# Patient Record
Sex: MALE | Race: WHITE | NOT HISPANIC OR LATINO | URBAN - METROPOLITAN AREA
[De-identification: names, ages, dates, MRNs, and addresses within clinical notes are randomized per-mention and may not be internally consistent; named-entity substitution may affect disease eponyms.]

---

## 2017-03-12 ENCOUNTER — COMMUNICATION - HEALTHEAST (OUTPATIENT)
Dept: CARDIOLOGY | Facility: CLINIC | Age: 82
End: 2017-03-12

## 2017-03-12 DIAGNOSIS — I25.83 CORONARY ARTERY DISEASE DUE TO LIPID RICH PLAQUE: ICD-10-CM

## 2017-03-12 DIAGNOSIS — I25.10 CORONARY ARTERY DISEASE DUE TO LIPID RICH PLAQUE: ICD-10-CM

## 2017-05-01 ENCOUNTER — COMMUNICATION - HEALTHEAST (OUTPATIENT)
Dept: CARDIOLOGY | Facility: CLINIC | Age: 82
End: 2017-05-01

## 2017-05-01 DIAGNOSIS — I25.10 CAD (CORONARY ARTERY DISEASE): ICD-10-CM

## 2017-08-04 ENCOUNTER — OFFICE VISIT - HEALTHEAST (OUTPATIENT)
Dept: CARDIOLOGY | Facility: CLINIC | Age: 82
End: 2017-08-04

## 2017-08-04 DIAGNOSIS — E78.5 DYSLIPIDEMIA: ICD-10-CM

## 2017-08-04 DIAGNOSIS — I10 ESSENTIAL HYPERTENSION: ICD-10-CM

## 2017-08-04 DIAGNOSIS — I25.10 CORONARY ARTERY DISEASE INVOLVING NATIVE CORONARY ARTERY OF NATIVE HEART WITHOUT ANGINA PECTORIS: ICD-10-CM

## 2017-08-04 ASSESSMENT — MIFFLIN-ST. JEOR: SCORE: 1466.86

## 2017-12-08 ENCOUNTER — COMMUNICATION - HEALTHEAST (OUTPATIENT)
Dept: CARDIOLOGY | Facility: CLINIC | Age: 82
End: 2017-12-08

## 2017-12-08 DIAGNOSIS — I25.83 CORONARY ARTERY DISEASE DUE TO LIPID RICH PLAQUE: ICD-10-CM

## 2017-12-08 DIAGNOSIS — I25.10 CORONARY ARTERY DISEASE DUE TO LIPID RICH PLAQUE: ICD-10-CM

## 2018-08-24 ENCOUNTER — COMMUNICATION - HEALTHEAST (OUTPATIENT)
Dept: CARDIOLOGY | Facility: CLINIC | Age: 83
End: 2018-08-24

## 2018-08-24 DIAGNOSIS — I25.10 CAD (CORONARY ARTERY DISEASE): ICD-10-CM

## 2018-08-27 ENCOUNTER — COMMUNICATION - HEALTHEAST (OUTPATIENT)
Dept: ADMINISTRATIVE | Facility: CLINIC | Age: 83
End: 2018-08-27

## 2018-10-11 ENCOUNTER — OFFICE VISIT - HEALTHEAST (OUTPATIENT)
Dept: CARDIOLOGY | Facility: CLINIC | Age: 83
End: 2018-10-11

## 2018-10-11 DIAGNOSIS — I25.10 CORONARY ARTERY DISEASE INVOLVING NATIVE CORONARY ARTERY OF NATIVE HEART WITHOUT ANGINA PECTORIS: ICD-10-CM

## 2018-10-11 DIAGNOSIS — E78.5 DYSLIPIDEMIA: ICD-10-CM

## 2018-10-11 DIAGNOSIS — R07.2 PRECORDIAL PAIN: ICD-10-CM

## 2018-10-11 DIAGNOSIS — I10 ESSENTIAL HYPERTENSION: ICD-10-CM

## 2018-10-11 ASSESSMENT — MIFFLIN-ST. JEOR: SCORE: 1503.14

## 2018-10-22 ENCOUNTER — COMMUNICATION - HEALTHEAST (OUTPATIENT)
Dept: CARDIOLOGY | Facility: CLINIC | Age: 83
End: 2018-10-22

## 2018-10-22 DIAGNOSIS — I25.83 CORONARY ARTERY DISEASE DUE TO LIPID RICH PLAQUE: ICD-10-CM

## 2018-10-22 DIAGNOSIS — I25.10 CORONARY ARTERY DISEASE DUE TO LIPID RICH PLAQUE: ICD-10-CM

## 2019-01-02 ENCOUNTER — COMMUNICATION - HEALTHEAST (OUTPATIENT)
Dept: CARDIOLOGY | Facility: CLINIC | Age: 84
End: 2019-01-02

## 2019-01-02 DIAGNOSIS — I25.10 CAD (CORONARY ARTERY DISEASE): ICD-10-CM

## 2019-01-03 ENCOUNTER — COMMUNICATION - HEALTHEAST (OUTPATIENT)
Dept: CARDIOLOGY | Facility: CLINIC | Age: 84
End: 2019-01-03

## 2019-01-03 DIAGNOSIS — I25.10 CAD (CORONARY ARTERY DISEASE): ICD-10-CM

## 2019-01-03 RX ORDER — NITROGLYCERIN 0.4 MG/1
0.4 TABLET SUBLINGUAL EVERY 5 MIN PRN
Qty: 3 BOTTLE | Refills: 2 | Status: SHIPPED | OUTPATIENT
Start: 2019-01-03

## 2019-01-21 ENCOUNTER — HOSPITAL ENCOUNTER (OUTPATIENT)
Dept: NUCLEAR MEDICINE | Facility: HOSPITAL | Age: 84
Discharge: HOME | End: 2019-01-21
Attending: INTERNAL MEDICINE

## 2019-01-21 ENCOUNTER — HOSPITAL ENCOUNTER (OUTPATIENT)
Dept: CARDIOLOGY | Facility: HOSPITAL | Age: 84
Discharge: HOME | End: 2019-01-21
Attending: INTERNAL MEDICINE

## 2019-01-21 DIAGNOSIS — R07.2 PRECORDIAL PAIN: ICD-10-CM

## 2019-01-21 DIAGNOSIS — I25.10 CORONARY ARTERY DISEASE INVOLVING NATIVE CORONARY ARTERY OF NATIVE HEART WITHOUT ANGINA PECTORIS: ICD-10-CM

## 2019-01-21 LAB
CV STRESS CURRENT BP HE: NORMAL
CV STRESS CURRENT HR HE: 62
CV STRESS CURRENT HR HE: 63
CV STRESS CURRENT HR HE: 74
CV STRESS CURRENT HR HE: 77
CV STRESS CURRENT HR HE: 79
CV STRESS CURRENT HR HE: 80
CV STRESS CURRENT HR HE: 81
CV STRESS CURRENT HR HE: 82
CV STRESS CURRENT HR HE: 83
CV STRESS CURRENT HR HE: 84
CV STRESS CURRENT HR HE: 85
CV STRESS CURRENT HR HE: 85
CV STRESS CURRENT HR HE: 88
CV STRESS DEVIATION TIME HE: NORMAL
CV STRESS ECHO PERCENT HR HE: NORMAL
CV STRESS EXERCISE STAGE HE: NORMAL
CV STRESS FINAL RESTING BP HE: NORMAL
CV STRESS FINAL RESTING HR HE: 77
CV STRESS MAX HR HE: 89
CV STRESS MAX TREADMILL GRADE HE: 0
CV STRESS MAX TREADMILL SPEED HE: 0
CV STRESS PEAK DIA BP HE: NORMAL
CV STRESS PEAK SYS BP HE: NORMAL
CV STRESS PHASE HE: NORMAL
CV STRESS PROTOCOL HE: NORMAL
CV STRESS RESTING PT POSITION HE: NORMAL
CV STRESS ST DEVIATION AMOUNT HE: NORMAL
CV STRESS ST DEVIATION ELEVATION HE: NORMAL
CV STRESS ST EVELATION AMOUNT HE: NORMAL
CV STRESS TEST TYPE HE: NORMAL
CV STRESS TOTAL STAGE TIME MIN 1 HE: NORMAL
NUC STRESS EJECTION FRACTION: 75 %
STRESS ECHO BASELINE BP: NORMAL
STRESS ECHO BASELINE HR: 59
STRESS ECHO CALCULATED PERCENT HR: 67 %
STRESS ECHO LAST STRESS BP: NORMAL
STRESS ECHO LAST STRESS HR: 85

## 2019-01-21 ASSESSMENT — MIFFLIN-ST. JEOR: SCORE: 1498.61

## 2019-02-07 ENCOUNTER — COMMUNICATION - HEALTHEAST (OUTPATIENT)
Dept: CARDIOLOGY | Facility: CLINIC | Age: 84
End: 2019-02-07

## 2019-02-18 ENCOUNTER — COMMUNICATION - HEALTHEAST (OUTPATIENT)
Dept: CARDIOLOGY | Facility: CLINIC | Age: 84
End: 2019-02-18

## 2019-04-25 ENCOUNTER — COMMUNICATION - HEALTHEAST (OUTPATIENT)
Dept: CARDIOLOGY | Facility: CLINIC | Age: 84
End: 2019-04-25

## 2019-06-06 ENCOUNTER — OFFICE VISIT - HEALTHEAST (OUTPATIENT)
Dept: CARDIOLOGY | Facility: CLINIC | Age: 84
End: 2019-06-06

## 2019-06-06 DIAGNOSIS — I10 ESSENTIAL HYPERTENSION: ICD-10-CM

## 2019-06-06 DIAGNOSIS — I25.10 CORONARY ARTERY DISEASE INVOLVING NATIVE CORONARY ARTERY OF NATIVE HEART WITHOUT ANGINA PECTORIS: ICD-10-CM

## 2019-06-06 DIAGNOSIS — E78.5 DYSLIPIDEMIA: ICD-10-CM

## 2019-06-06 ASSESSMENT — MIFFLIN-ST. JEOR: SCORE: 1486.36

## 2019-07-24 ENCOUNTER — COMMUNICATION - HEALTHEAST (OUTPATIENT)
Dept: ADMINISTRATIVE | Facility: CLINIC | Age: 84
End: 2019-07-24

## 2019-09-12 ENCOUNTER — OFFICE VISIT - HEALTHEAST (OUTPATIENT)
Dept: CARDIOLOGY | Facility: CLINIC | Age: 84
End: 2019-09-12

## 2019-09-12 DIAGNOSIS — E78.5 DYSLIPIDEMIA: ICD-10-CM

## 2019-09-12 DIAGNOSIS — I10 HYPERTENSION, UNSPECIFIED TYPE: ICD-10-CM

## 2019-09-12 DIAGNOSIS — I25.10 CORONARY ARTERY DISEASE INVOLVING NATIVE CORONARY ARTERY OF NATIVE HEART WITHOUT ANGINA PECTORIS: ICD-10-CM

## 2019-09-12 LAB — LDLC SERPL CALC-MCNC: 73 MG/DL

## 2019-09-12 ASSESSMENT — MIFFLIN-ST. JEOR: SCORE: 1478.19

## 2020-02-20 ENCOUNTER — RECORDS - HEALTHEAST (OUTPATIENT)
Dept: ADMINISTRATIVE | Facility: OTHER | Age: 85
End: 2020-02-20

## 2020-03-07 ENCOUNTER — HOSPITAL ENCOUNTER (OUTPATIENT)
Dept: CT IMAGING | Facility: HOSPITAL | Age: 85
Discharge: HOME | End: 2020-03-07

## 2020-03-07 DIAGNOSIS — I63.9 ISCHEMIC STROKE (H): ICD-10-CM

## 2020-04-02 ENCOUNTER — COMMUNICATION - HEALTHEAST (OUTPATIENT)
Dept: CARDIOLOGY | Facility: CLINIC | Age: 85
End: 2020-04-02

## 2020-04-03 ENCOUNTER — AMBULATORY - HEALTHEAST (OUTPATIENT)
Dept: CARDIOLOGY | Facility: CLINIC | Age: 85
End: 2020-04-03

## 2020-04-09 ENCOUNTER — OFFICE VISIT - HEALTHEAST (OUTPATIENT)
Dept: CARDIOLOGY | Facility: CLINIC | Age: 85
End: 2020-04-09

## 2020-04-09 DIAGNOSIS — I25.10 CORONARY ARTERY DISEASE INVOLVING NATIVE CORONARY ARTERY OF NATIVE HEART WITHOUT ANGINA PECTORIS: ICD-10-CM

## 2020-04-09 DIAGNOSIS — I10 ESSENTIAL HYPERTENSION: ICD-10-CM

## 2020-04-09 DIAGNOSIS — E78.5 DYSLIPIDEMIA: ICD-10-CM

## 2020-04-09 RX ORDER — TOLTERODINE 4 MG/1
4 CAPSULE, EXTENDED RELEASE ORAL DAILY
Status: SHIPPED | COMMUNITY
Start: 2020-03-15

## 2020-04-09 RX ORDER — TAMSULOSIN HYDROCHLORIDE 0.4 MG/1
0.4 CAPSULE ORAL EVERY EVENING
Status: SHIPPED | COMMUNITY
Start: 2020-02-13

## 2020-04-09 RX ORDER — CLOPIDOGREL BISULFATE 75 MG/1
1 TABLET ORAL EVERY EVENING
Status: SHIPPED | COMMUNITY
Start: 2020-03-13

## 2020-05-21 ENCOUNTER — COMMUNICATION - HEALTHEAST (OUTPATIENT)
Dept: CARDIOLOGY | Facility: CLINIC | Age: 85
End: 2020-05-21

## 2020-05-21 DIAGNOSIS — I25.10 CORONARY ARTERY DISEASE INVOLVING NATIVE CORONARY ARTERY OF NATIVE HEART WITHOUT ANGINA PECTORIS: ICD-10-CM

## 2020-05-21 RX ORDER — ISOSORBIDE MONONITRATE 120 MG/1
TABLET, EXTENDED RELEASE ORAL
Qty: 90 TABLET | Refills: 3 | Status: SHIPPED | OUTPATIENT
Start: 2020-05-21

## 2020-10-01 ENCOUNTER — RECORDS - HEALTHEAST (OUTPATIENT)
Dept: ADMINISTRATIVE | Facility: OTHER | Age: 85
End: 2020-10-01

## 2020-10-01 ENCOUNTER — AMBULATORY - HEALTHEAST (OUTPATIENT)
Dept: CARDIOLOGY | Facility: CLINIC | Age: 85
End: 2020-10-01

## 2020-10-01 ENCOUNTER — COMMUNICATION - HEALTHEAST (OUTPATIENT)
Dept: CARDIOLOGY | Facility: CLINIC | Age: 85
End: 2020-10-01

## 2020-10-02 ENCOUNTER — SURGERY - HEALTHEAST (OUTPATIENT)
Dept: CARDIOLOGY | Facility: CLINIC | Age: 85
End: 2020-10-02

## 2020-10-02 ENCOUNTER — AMBULATORY - HEALTHEAST (OUTPATIENT)
Dept: LAB | Facility: CLINIC | Age: 85
End: 2020-10-02

## 2020-10-02 ENCOUNTER — AMBULATORY - HEALTHEAST (OUTPATIENT)
Dept: CARDIOLOGY | Facility: CLINIC | Age: 85
End: 2020-10-02

## 2020-10-02 ENCOUNTER — OFFICE VISIT - HEALTHEAST (OUTPATIENT)
Dept: CARDIOLOGY | Facility: CLINIC | Age: 85
End: 2020-10-02

## 2020-10-02 DIAGNOSIS — I25.10 CAD (CORONARY ARTERY DISEASE): ICD-10-CM

## 2020-10-02 DIAGNOSIS — I25.118 CORONARY ARTERY DISEASE INVOLVING NATIVE CORONARY ARTERY OF NATIVE HEART WITH OTHER FORM OF ANGINA PECTORIS (H): ICD-10-CM

## 2020-10-02 ASSESSMENT — MIFFLIN-ST. JEOR: SCORE: 1485

## 2020-10-04 ENCOUNTER — COMMUNICATION - HEALTHEAST (OUTPATIENT)
Dept: SCHEDULING | Facility: CLINIC | Age: 85
End: 2020-10-04

## 2020-10-06 ENCOUNTER — SURGERY - HEALTHEAST (OUTPATIENT)
Dept: CARDIOLOGY | Facility: CLINIC | Age: 85
End: 2020-10-06

## 2020-10-06 ASSESSMENT — MIFFLIN-ST. JEOR: SCORE: 1478.65

## 2020-10-20 ENCOUNTER — OFFICE VISIT - HEALTHEAST (OUTPATIENT)
Dept: CARDIOLOGY | Facility: CLINIC | Age: 85
End: 2020-10-20

## 2020-10-20 DIAGNOSIS — I25.118 CORONARY ARTERY DISEASE INVOLVING NATIVE CORONARY ARTERY OF NATIVE HEART WITH OTHER FORM OF ANGINA PECTORIS (H): ICD-10-CM

## 2020-10-20 DIAGNOSIS — I10 ESSENTIAL HYPERTENSION, BENIGN: ICD-10-CM

## 2020-10-20 DIAGNOSIS — E78.2 MIXED HYPERLIPIDEMIA: ICD-10-CM

## 2020-10-20 RX ORDER — PANTOPRAZOLE SODIUM 40 MG/1
40 TABLET, DELAYED RELEASE ORAL DAILY
Status: SHIPPED | COMMUNITY
Start: 2020-10-06

## 2020-10-20 ASSESSMENT — MIFFLIN-ST. JEOR: SCORE: 1511.31

## 2020-11-16 ENCOUNTER — COMMUNICATION - HEALTHEAST (OUTPATIENT)
Dept: CARDIOLOGY | Facility: CLINIC | Age: 85
End: 2020-11-16

## 2020-11-17 ENCOUNTER — OFFICE VISIT - HEALTHEAST (OUTPATIENT)
Dept: CARDIOLOGY | Facility: CLINIC | Age: 85
End: 2020-11-17

## 2020-11-17 DIAGNOSIS — E78.5 DYSLIPIDEMIA: ICD-10-CM

## 2020-11-17 DIAGNOSIS — I10 ESSENTIAL HYPERTENSION: ICD-10-CM

## 2020-11-17 DIAGNOSIS — I25.10 CORONARY ARTERY DISEASE INVOLVING NATIVE CORONARY ARTERY OF NATIVE HEART WITHOUT ANGINA PECTORIS: ICD-10-CM

## 2020-11-17 ASSESSMENT — MIFFLIN-ST. JEOR: SCORE: 1494.07

## 2021-04-08 ENCOUNTER — COMMUNICATION - HEALTHEAST (OUTPATIENT)
Dept: ADMINISTRATIVE | Facility: CLINIC | Age: 86
End: 2021-04-08

## 2021-05-19 ENCOUNTER — RECORDS - HEALTHEAST (OUTPATIENT)
Dept: LAB | Facility: CLINIC | Age: 86
End: 2021-05-19

## 2021-05-20 LAB — BACTERIA SPEC CULT: NORMAL

## 2021-05-22 ENCOUNTER — HEALTH MAINTENANCE LETTER (OUTPATIENT)
Age: 86
End: 2021-05-22

## 2021-05-27 ENCOUNTER — RECORDS - HEALTHEAST (OUTPATIENT)
Dept: ADMINISTRATIVE | Facility: CLINIC | Age: 86
End: 2021-05-27

## 2021-05-27 ENCOUNTER — RECORDS - HEALTHEAST (OUTPATIENT)
Dept: LAB | Facility: CLINIC | Age: 86
End: 2021-05-27

## 2021-05-28 LAB
ANION GAP SERPL CALCULATED.3IONS-SCNC: 15 MMOL/L (ref 5–18)
BUN SERPL-MCNC: 19 MG/DL (ref 8–28)
CALCIUM SERPL-MCNC: 9.4 MG/DL (ref 8.5–10.5)
CHLORIDE BLD-SCNC: 97 MMOL/L (ref 98–107)
CO2 SERPL-SCNC: 25 MMOL/L (ref 22–31)
CREAT SERPL-MCNC: 0.93 MG/DL (ref 0.7–1.3)
ERYTHROCYTE [DISTWIDTH] IN BLOOD BY AUTOMATED COUNT: 13 % (ref 11–14.5)
GFR SERPL CREATININE-BSD FRML MDRD: >60 ML/MIN/1.73M2
GLUCOSE BLD-MCNC: 84 MG/DL (ref 70–125)
HCT VFR BLD AUTO: 38.2 % (ref 40–54)
HGB BLD-MCNC: 12.7 G/DL (ref 14–18)
MCH RBC QN AUTO: 31.4 PG (ref 27–34)
MCHC RBC AUTO-ENTMCNC: 33.2 G/DL (ref 32–36)
MCV RBC AUTO: 95 FL (ref 80–100)
PLATELET # BLD AUTO: 106 THOU/UL (ref 140–440)
PMV BLD AUTO: 9.8 FL (ref 8.5–12.5)
POTASSIUM BLD-SCNC: 4.7 MMOL/L (ref 3.5–5)
RBC # BLD AUTO: 4.04 MILL/UL (ref 4.4–6.2)
SODIUM SERPL-SCNC: 137 MMOL/L (ref 136–145)
WBC: 6.6 THOU/UL (ref 4–11)

## 2021-05-28 NOTE — TELEPHONE ENCOUNTER
----- Message from Nicky Sweet sent at 4/25/2019  3:55 PM CDT -----  Contact: Patient   Caller: Byron    Primary cardiologist: Dr. Islas    Detailed reason for call: Byron states he's been having chest pain and his PCP said he should see the cardiologist. Byron made an appt w/Dr. Islas on 6/6/19. Please call back.     New or active symptoms? See above    Best phone number: [phone]    Best time to contact: Today    Ok to leave a detailed message? No    Device? No

## 2021-05-28 NOTE — TELEPHONE ENCOUNTER
Patient was called by  for appointment in recall and reported 1 episode of stabbing chest pain about 2 weeks ago lasting for 30 minutes. He took 3 nitroglycerin tablets during that 30 minute period. He is feeling well since without any new or worsening symptoms.     He was instructed to call 911 with any further prolonged episode of chest pain.  Patient verbalized understanding and will return the call with any further symptoms to be set up in [facility].

## 2021-05-29 ENCOUNTER — COMMUNICATION - HEALTHEAST (OUTPATIENT)
Dept: SCHEDULING | Facility: CLINIC | Age: 86
End: 2021-05-29

## 2021-05-31 VITALS — WEIGHT: 187 LBS | HEIGHT: 67 IN | BODY MASS INDEX: 29.35 KG/M2

## 2021-06-01 VITALS — BODY MASS INDEX: 30.61 KG/M2 | HEIGHT: 67 IN | WEIGHT: 195 LBS

## 2021-06-02 VITALS — HEIGHT: 67 IN | WEIGHT: 194 LBS | BODY MASS INDEX: 30.45 KG/M2

## 2021-06-02 VITALS — WEIGHT: 194.8 LBS | BODY MASS INDEX: 31.31 KG/M2 | HEIGHT: 66 IN

## 2021-06-03 VITALS
HEART RATE: 64 BPM | RESPIRATION RATE: 16 BRPM | DIASTOLIC BLOOD PRESSURE: 66 MMHG | HEIGHT: 66 IN | WEIGHT: 193 LBS | SYSTOLIC BLOOD PRESSURE: 136 MMHG | BODY MASS INDEX: 31.02 KG/M2

## 2021-06-04 VITALS
DIASTOLIC BLOOD PRESSURE: 60 MMHG | BODY MASS INDEX: 30.29 KG/M2 | WEIGHT: 193 LBS | HEIGHT: 67 IN | HEART RATE: 56 BPM | OXYGEN SATURATION: 93 % | SYSTOLIC BLOOD PRESSURE: 140 MMHG | RESPIRATION RATE: 16 BRPM

## 2021-06-04 VITALS
WEIGHT: 191 LBS | DIASTOLIC BLOOD PRESSURE: 50 MMHG | RESPIRATION RATE: 10 BRPM | HEART RATE: 54 BPM | SYSTOLIC BLOOD PRESSURE: 128 MMHG | HEIGHT: 67 IN | BODY MASS INDEX: 29.98 KG/M2

## 2021-06-04 VITALS
BODY MASS INDEX: 31.15 KG/M2 | DIASTOLIC BLOOD PRESSURE: 64 MMHG | WEIGHT: 193 LBS | SYSTOLIC BLOOD PRESSURE: 134 MMHG | HEART RATE: 57 BPM

## 2021-06-04 VITALS
HEART RATE: 60 BPM | RESPIRATION RATE: 16 BRPM | HEIGHT: 67 IN | DIASTOLIC BLOOD PRESSURE: 52 MMHG | SYSTOLIC BLOOD PRESSURE: 122 MMHG | WEIGHT: 196.8 LBS | BODY MASS INDEX: 30.89 KG/M2

## 2021-06-04 VITALS — HEIGHT: 67 IN | BODY MASS INDEX: 29.76 KG/M2 | WEIGHT: 189.6 LBS

## 2021-06-07 ENCOUNTER — AMBULATORY - HEALTHEAST (OUTPATIENT)
Dept: OTHER | Facility: CLINIC | Age: 86
End: 2021-06-07

## 2021-06-07 ENCOUNTER — DOCUMENTATION ONLY (OUTPATIENT)
Dept: OTHER | Facility: CLINIC | Age: 86
End: 2021-06-07

## 2021-06-07 NOTE — TELEPHONE ENCOUNTER
----- Message from Charleen Akins sent at 4/2/2020 10:06 AM CDT -----  General phone call:  PATIENT WAS INPATIENT AT [facility] FOR CHEST PAIN,EVERYTHING TESTED OUT WELL, BUT THEY WANT HIM TO FOLLOW UP WITH [facility].  SHOULD THIS BE TELEPHONE, OFFICE OR POST PONE?  PLEASE CALL  Caller: WIFE, TANYA  Primary cardiologist: DR HAM  Detailed reason for call: SEE ABOVE  New or active symptoms? NO, NOT AT THIS TIME  Best phone number: [phone]  Best time to contact: ANY TIME  Ok to leave a detailed message? YES  Device? NO    Additional Info:

## 2021-06-07 NOTE — TELEPHONE ENCOUNTER
Hi,  Per recommendation below by [facility] cardiology please call patient and arrange telephone visit for next week with Dr. Islas.  Thank you!    ---------------------------------------  Per [facility] addmission docummentation 3/31:    Troponins were trended and remained negative x3. The patients stress test results show a low risk scan and his TTE is unremarkable except for a possible elevated LVEDP but the patient does not report SOB. Discussed with cardiology who reviewed the imaging and recommended discharging the patient with continued medication regimen and [facility] with a telephone visit within the next week. Communicated these recommendations to patient and son who understood and would make necessary follow up arrangements after discharge.     Other specialty follow-up not included in DC orders: Patient should follow up with outpatient cardiologist within a week for phone follow up

## 2021-06-11 NOTE — TELEPHONE ENCOUNTER
Wellness Screening Tool  Symptom Screening:  Do you have one of the following NEW symptoms:    Fever (subjective or >100.0)?  No    A new cough?  No    Shortness of breath?  No     Chills? No     New loss of taste or smell? No     Generalized body aches? No     New persistent headache? No     New sore throat? No     Nausea, vomiting, or diarrhea?  No    Within the past 2 weeks, have you been exposed to someone with a known positive illness below:    COVID-19 (known or suspected)?  No    Chicken pox?  No    Mealses?  No    Pertussis?  No    Patient notified of visitor policy- They may have one person accompany them to their appointment, but they will need to wear a mask and will be screened upon arrival for symptoms: Yes  Pt informed to wear a mask: Yes  Pt notified if they develop any symptoms listed above, prior to their appointment, they are to call the clinic directly at [phone] for further instructions.  Yes  Patient's appointment status: Patient will be seen in clinic as scheduled on 10/2

## 2021-06-12 NOTE — PATIENT INSTRUCTIONS - HE
Byron Nath,    It was a pleasure to see you today at the [facility].     My recommendations after this visit include:    Coronary angiogram    LORNA Alanis MD, FACC, DELMIS

## 2021-06-12 NOTE — PROGRESS NOTES
Byron [facility]  [address]  [place] [address]  [phone] (home)     Primary cardiologist:  Dr Islas  PCP:  Vinh Heart MD  Procedure:  CA poss PCI  H&P completed by:  Dr Zeke Hemphill MD:  Dr Rivers or Dr Rose  Admit date and time:  10/6/20 07:30 am  Ordering MD:  Dr Aalnis  Diagnosis:  CAD  Anticoagulation: None  CPAP: No  Bypass Grafts: Yes  Renal Issues: No  Allergies:   Allergies   Allergen Reactions     Rosuvastatin Other (See Comments)     Myalgia      Diabetic?: No  Device?: No      Angiogram Teaching    Reason for Visit:  Patient seen for pre-procedure education in preparation for: coronary angiogram with possible coronary intervention    Procedure Prep:  Cardiologist note dated: 10/2/20  EKG results obtained, dated: on admission  Pertinent test results obtained - Viewable in Epic, dated: 1/21/10 NM stress test, 3/4/09 CABx2, 3/2/09 cath  Hemogram results obtained: on admission  Basic Metabolic Panel results obtained: on admission  Lipid Profile results obtained: on admission    Pre-procedure instructions  Patient instructed to be NPO after midnight.  Patient instructed to arrange for transportation home following procedure.  Patient instructed to have a responsible adult with them for 24 hours post-procedure.  Post-procedure follow up process.  Conscious sedation discussed.  The patient was given the pre-procedure letter on 10/2/20    Pre-procedure medication instructions  Patient instructed on antiplatelet medication.  Continue medications as scheduled, with a small amount of water on the day of the procedure unless indicated.  Patient instructed to take 325 mg of Aspirin am of procedure: Yes  Other medication: instructed to hold all except clopidogrel, isosorbide, metoprolol, tolterodine, a.m. of the procedure.    *PATIENTS RECORDS AVAILABLE IN [employer] UNLESS OTHERWISE INDICATED*    *Order set was entered on this date: 10/2/20      Patient Active Problem List    Diagnosis     CAD (coronary artery disease)     Coronary artery disease involving native coronary artery of native heart with other form of angina pectoris (H)       Current Outpatient Medications   Medication Sig Dispense Refill     calcium carbonate-vitamin D3 600 mg calcium- 200 unit cap Take 1 tablet by mouth 2 (two) times a day.       citalopram (CELEXA) 40 MG tablet Take 20 mg by mouth daily.        clopidogreL (PLAVIX) 75 mg tablet Take 1 tablet by mouth every evening.        denosumab 60 mg/mL Syrg Inject 60 mg under the skin every 6 (six) months.       docusate sodium (COLACE ORAL) Take 1 capsule by mouth 2 (two) times a day.        donepeziL (ARICEPT) 10 MG tablet Take 10 mg by mouth at bedtime.       isosorbide mononitrate (IMDUR) 120 MG 24 hr tablet Take 1 tablet  by mouth daily. 90 tablet 3     loratadine (CLARITIN) 10 mg tablet Take 10 mg by mouth daily.       melatonin 3 mg Tab tablet Take 6 mg by mouth at bedtime.       metoprolol (LOPRESSOR) 25 MG tablet Take 12.5 mg by mouth 2 (two) times a day.        nitroglycerin (NITROSTAT) 0.4 MG SL tablet Place 1 tablet (0.4 mg total) under the tongue every 5 (five) minutes as needed for chest pain. Max 6 tabs per day and call 911 if no relief 3 Bottle 2     psyllium with dextrose (METAMUCIL JAR) powder Take 3.4 g by mouth daily.        simvastatin (ZOCOR) 40 MG tablet Take 40 mg by mouth bedtime.       tamsulosin (FLOMAX) 0.4 mg cap Take 0.4 mg by mouth every evening.        tolterodine (DETROL LA) 4 MG ER capsule Take 4 mg by mouth daily.       No current facility-administered medications for this visit.        Allergies   Allergen Reactions     Rosuvastatin Other (See Comments)     Myalgia        Plan  Patient ready for procedure    Almita RIVERA RN

## 2021-06-16 PROBLEM — G31.84 MCI (MILD COGNITIVE IMPAIRMENT): Status: ACTIVE | Noted: 2019-01-01

## 2021-06-16 PROBLEM — F03.90 DEMENTIA (H): Status: ACTIVE | Noted: 2020-01-01

## 2021-06-16 PROBLEM — M25.561 ACUTE PAIN OF RIGHT KNEE: Status: ACTIVE | Noted: 2017-07-02

## 2021-06-16 PROBLEM — C44.91 MALIGNANT BASAL CELL NEOPLASM OF SKIN: Status: ACTIVE | Noted: 2021-05-28

## 2021-06-16 PROBLEM — I25.118 CORONARY ARTERY DISEASE INVOLVING NATIVE CORONARY ARTERY OF NATIVE HEART WITH OTHER FORM OF ANGINA PECTORIS (H): Status: ACTIVE | Noted: 2020-10-02

## 2021-06-16 PROBLEM — I20.0 UNSTABLE ANGINA PECTORIS (H): Status: ACTIVE | Noted: 2017-06-19

## 2021-06-16 PROBLEM — K76.9 LIVER LESION: Status: ACTIVE | Noted: 2021-05-29

## 2021-06-16 PROBLEM — I63.9 CVA (CEREBRAL VASCULAR ACCIDENT) (H): Status: ACTIVE | Noted: 2020-01-01

## 2021-06-16 PROBLEM — I10 HTN (HYPERTENSION): Status: ACTIVE | Noted: 2021-05-28

## 2021-06-16 PROBLEM — R09.02 HYPOXIA: Status: ACTIVE | Noted: 2021-05-28

## 2021-06-16 PROBLEM — R07.89 ATYPICAL CHEST PAIN: Status: ACTIVE | Noted: 2018-04-07

## 2021-06-16 PROBLEM — M81.0 AGE RELATED OSTEOPOROSIS: Status: ACTIVE | Noted: 2020-03-02

## 2021-06-16 PROBLEM — R79.89 ELEVATED LFTS: Status: ACTIVE | Noted: 2021-05-29

## 2021-06-16 PROBLEM — J98.59 MEDIASTINAL MASS: Status: ACTIVE | Noted: 2021-05-29

## 2021-06-19 NOTE — LETTER
Letter by Jhon Islas MD at      Author: Jhon Islas MD Service: -- Author Type: --    Filed:  Encounter Date: 7/24/2019 Status: (Other)         Byron Nath  [address] [place] [address]      July 24, 2019      Dear Byron,    This letter is to remind you that you will be due for your follow up appointment with Dr. Williams Islas  . To help ensure you are in the best health possible, a regular follow-up with your cardiologist is essential.     Please call our Patient Scheduling Line at [phone] to schedule your appointment at your earliest convenience.  If you have recently scheduled an appointment, please disregard this letter.    We look forward to seeing you again. As always, we are available at the number  above for any questions or concerns you may have.      Sincerely,     The Physicians and Staff of [facility]

## 2021-06-19 NOTE — LETTER
Letter by Jhon Islas MD at      Author: Jhon Islas MD Service: -- Author Type: --    Filed:  Encounter Date: 7/24/2019 Status: (Other)         Byron Nath  [address] [address]      July 24, 2019      Dear Byron,    This letter is to remind you that you will be due for your follow up appointment with Dr. Williams Islas  . To help ensure you are in the best health possible, a regular follow-up with your cardiologist is essential.     Please call our Patient Scheduling Line at [phone] to schedule your appointment at your earliest convenience.  If you have recently scheduled an appointment, please disregard this letter.    We look forward to seeing you again. As always, we are available at the number  above for any questions or concerns you may have.      Sincerely,     The Physicians and Staff of [facility]

## 2021-06-21 NOTE — LETTER
Letter by Jhon Islas MD at      Author: Jhon Islas MD Service: -- Author Type: --    Filed:  Encounter Date: 4/8/2021 Status: (Other)         Byron Nath  [address]  [place] [address]      April 8, 2021      Dear Byron,    This letter is to remind you that you will be due for your follow up appointment with Dr. Williams Islas in April, 2021 . To help ensure you are in the best health possible, a regular follow-up with your cardiologist is essential.     Please call our Patient Scheduling Line at [phone] to schedule your appointment at your earliest convenience.  If you have recently scheduled an appointment, please disregard this letter.    We look forward to seeing you again. As always, we are available at the number  above for any questions or concerns you may have.      Sincerely,     The Physicians and Staff of [facility]

## 2021-06-23 NOTE — TELEPHONE ENCOUNTER
----- Message from Charleen Akins sent at 2/7/2019 10:37 AM CST -----  Contact: S/O TANYA WOLF  General phone call:  BONE DENSITY ISSUES, DR HAM WANTED TO TRY A NEW MEDICATION, IT WAS SENT TO [facility], HOWEVER THE PHARMACY DIDN'T HEAR FROM DR HAM OR INSURANCE, PLEASE CALL TO HELP  Caller: TANYA  Primary cardiologist: DR HAM  Detailed reason for call: SEE ABOVE  New or active symptoms? YES, SEE ABOVE  Best phone number: [phone]  Best time to contact: ANY TIME  Ok to leave a detailed message? YES  Device? NO    Additional Info:

## 2021-06-25 NOTE — PROGRESS NOTES
Progress Notes by Jhon Islas MD at 8/4/2017 11:30 AM     Author: Jhon Islas MD Service: -- Author Type: Physician    Filed: 8/4/2017 11:48 AM Encounter Date: 8/4/2017 Status: Signed    : Jhon Islas MD (Physician)                  [facility]/Heart           Thank you Dr. Heart for asking the [facility] team to participate in the care of your patient, Byron Nath.     Impression and Plan     1. Coronary artery disease. Byron has known coronary artery disease having had 2 vessel bypass surgery for March 2009 with CECE graft to the LAD and saphenous vein graft to the PL branch of the right coronary artery.  Recent nuclear perfusion study 20 June 2017 performed at [facility] was favorable and revealed no evidence of ischemia.    This overall is stable.  Patient reports no chest pain symptoms at this time.     2. Hypertension. Blood pressures quite reasonable in the office today.     3. Dyslipidemia.  Lipid profile 6 October 2016 was fairly close to target with LDL 88 mg/dL and HDL 38 mg/dL.    Plan on follow-up in one year.          History of Present Illness    Once again I would like to thank you again for asking me to participate in the care of your patient, Byron Nath.  As you know, but to reiterate for my own records, Byron Nath is a 86 y.o. male with known coronary disease. He underwent 2 vessel bypass surgery March 4, 2009 with CECE graft to the LAD and saphenous vein graft to the PL branch of the right coronary artery. Historically, he has had well-preserved LV systolic performance.    In late June 2017, patient had been hospitalized at [facility] after having an episode of chest discomfort.  He had an evaluation at that time which included a regadenoson nuclear perfusion study on 20 June 2017 which was favorable and revealed no evidence of ischemia.     In follow-up today, Byron denies any recurrent chest pain symptoms.  He states his  "breathing is comfortable.  He denies any decline in exercise tolerance other than that related to some mild orthopedic issues.  He reports no palpitations or lightheadedness.    Further review of systems is otherwise negative/noncontributory (medical record and 13 point review of systems reviewed as well and pertinent positives noted).         Cardiac Diagnostics      Echocardiogram 17 June 2016 (personally reviewed):  1. Normal left ventricular size and systolic performance. The ejection fraction is estimated to be 55%.  2. There is mild aortic valve sclerosis without significant valvular stenosis.    Echocardiogram from 7 May 2007  1. Normal LV size and systolic performance with ejection fraction of 65%.    2. No significant valvular disease.    Regadenoson nuclear perfusion study 20 June 2017 (performed at [facility]):  1. No evidence of infarct or ischemia.  2. Normal left ventricular systolic performance with ejection fraction of 59%.     Nuclear stress perfusion study performed 12 March 2013:  1. No ECG evidence of ischemia.  2. No evidence of ischemia or infarct by nuclear imaging.  3. Normal ejection fraction at 72% without wall motion abnormality.           Physical Examination       /72 (Patient Site: Right Arm, Patient Position: Sitting, Cuff Size: Adult Regular)  Pulse 80  Resp 16  Ht 5' 7\" (1.702 m)  Wt 187 lb (84.8 kg)  SpO2 96%  BMI 29.29 kg/m2        Wt Readings from Last 3 Encounters:   08/04/17 187 lb (84.8 kg)   07/01/16 195 lb (88.5 kg)   05/19/16 195 lb 12.8 oz (88.8 kg)     The patient is alert and oriented times three. Sclerae are anicteric. Mucosal membranes are moist. Jugular venous pressure is normal. No significant adenopathy/thyromegally appreciated. Lungs are clear with good expansion. On cardiovascular exam, the patient has a regular S1 and S2. Abdomen is soft and non-tender. Extremities reveal no clubbing, cyanosis, or edema.           Family History/Social " History/Risk Factors   Patient does not smoke.  Family history of hypertension.         Medications  Allergies   Current Outpatient Prescriptions   Medication Sig Dispense Refill   ? aspirin 81 MG EC tablet Take 81 mg by mouth daily.     ? citalopram (CELEXA) 40 MG tablet Take 40 mg by mouth daily.     ? isosorbide mononitrate (IMDUR) 30 MG 24 hr tablet TAKE 1 TABLET DAILY 90 tablet 2   ? loratadine (CLARITIN) 10 mg tablet Take 10 mg by mouth daily.     ? simvastatin (ZOCOR) 40 MG tablet Take 40 mg by mouth bedtime.     ? metoprolol (LOPRESSOR) 25 MG tablet Take 25 mg by mouth 2 (two) times a day.     ? nitroglycerin (NITROSTAT) 0.4 MG SL tablet Place 1 tablet (0.4 mg total) under the tongue every 5 (five) minutes as needed for chest pain. 25 tablet 3     No current facility-administered medications for this visit.       Allergies   Allergen Reactions   ? Rosuvastatin Other (See Comments)          Lab Results     Lab Results   Component Value Date    CHOL 160 01/05/2011    TRIG 158 (H) 01/05/2011    HDL 37 (L) 01/05/2011    LDLCALC 91 01/05/2011

## 2021-06-26 NOTE — PROGRESS NOTES
Progress Notes by Jhon Islas MD at 10/11/2018  4:00 PM     Author: Jhon Islas MD Service: -- Author Type: Physician    Filed: 10/11/2018  4:10 PM Encounter Date: 10/11/2018 Status: Signed    : Jhon Islas MD (Physician)                  [facility]org/Heart  [phone]         Thank you Dr. Heart for asking the [facility] Heart Care team to participate in the care of your patient, Byron Nath.     Impression and Plan     1. Coronary artery disease. Byron has known coronary artery disease having had 2 vessel bypass surgery for March 2009 with CECE graft to the LAD and saphenous vein graft to the PL branch of the right coronary artery.  As noted below, patient does report some subjective increase in chest discomfort symptoms that do seem to be improved with nitroglycerin.  He has had some perceived increase in shortness of breath with certain activities as well.  Plan:    Nuclear perfusion imaging study    Continue current antianginal therapy.  ?  2. Hypertension. Blood pressures quite reasonable in the office today.  3. Dyslipidemia.  Lipid profile 6 October 2016 was fairly close to target with LDL 88 mg/dL and HDL 38 mg/dL.  Follow-up and further recommendations pending nuclear perfusion imaging results.         History of Present Illness    Once again I would like to thank you again for asking me to participate in the care of your patient, Byron Nath.  As you know, but to reiterate for my own records, Byron Nath is a 88 y.o. male with known coronary disease. He underwent 2 vessel bypass surgery March 4, 2009 with CECE graft to the LAD and saphenous vein graft to the PL branch of the right coronary artery. Historically, he has had well-preserved LV systolic performance     Interview today, Byron does report some increasing chest discomfort symptoms reminiscent of his angina.  He has taken nitroglycerin on occasion which has helped.  He also reports some  "mild increased shortness of breath with certain activities.  He denies any palpitations or lightheadedness.    Further review of systems is otherwise negative/noncontributory (medical record and 13 point review of systems reviewed as well and pertinent positives noted).         Cardiac Diagnostics      Echocardiogram 17 June 2016 (personally reviewed):  1. Normal left ventricular size and systolic performance. The ejection fraction is estimated to be 55%.  2. There is mild aortic valve sclerosis without significant valvular stenosis.    Echocardiogram from 7 May 2007  1. Normal LV size and systolic performance with ejection fraction of 65%.    2. No significant valvular disease.    Regadenoson nuclear perfusion study 20 June 2017 (performed at [facility]):  1. No evidence of infarct or ischemia.  2. Normal left ventricular systolic performance with ejection fraction of 59%.    Nuclear stress perfusion study performed 12 March 2013:  1. No ECG evidence of ischemia.  2. No evidence of ischemia or infarct by nuclear imaging.  3. Normal ejection fraction at 72% without wall motion abnormality.           Physical Examination       /60  Pulse 76  Resp 20  Ht 5' 7\" (1.702 m)  Wt 195 lb (88.5 kg)  BMI 30.54 kg/m2        Wt Readings from Last 3 Encounters:   10/11/18 195 lb (88.5 kg)   08/04/17 187 lb (84.8 kg)   07/01/16 195 lb (88.5 kg)     The patient is alert and oriented times three. Sclerae are anicteric. Mucosal membranes are moist. Jugular venous pressure is normal. No significant adenopathy/thyromegally appreciated. Lungs are clear with good expansion. On cardiovascular exam, the patient has a regular S1 and S2. Abdomen is soft and non-tender. Extremities reveal no clubbing, cyanosis, or edema.         Family History/Social History/Risk Factors   Patient does not smoke.  Family history of hypertension.         Medications  Allergies   Current Outpatient Prescriptions   Medication Sig Dispense Refill "   ? acetylcysteine (NAC) 600 mg cap capsule Take 600 mg by mouth.     ? aspirin 81 MG EC tablet Take 81 mg by mouth daily.     ? calcium carbonate-vitamin D2 500 mg(1,250mg) -200 unit tablet Take 1 tablet by mouth 2 (two) times a day.     ? citalopram (CELEXA) 40 MG tablet Take 20 mg by mouth daily.      ? docusate sodium (COLACE ORAL) Take by mouth.     ? isosorbide mononitrate (IMDUR) 30 MG 24 hr tablet TAKE 1 TABLET DAILY (Patient taking differently: TAKE 2 TABLETS DAILY) 90 tablet 2   ? loratadine (CLARITIN) 10 mg tablet Take 10 mg by mouth daily.     ? metoprolol (LOPRESSOR) 25 MG tablet Take 12.5 mg by mouth 2 (two) times a day.      ? MULTIVITAMIN ORAL Take by mouth.     ? nitroglycerin (NITROSTAT) 0.4 MG SL tablet PLACE 1 TABLET UNDER THE TONGUE EVERY 5 MINUTES AS NEEDED FOR CHEST PAIN  3 Bottle 0   ? simvastatin (ZOCOR) 40 MG tablet Take 40 mg by mouth bedtime.       No current facility-administered medications for this visit.       Allergies   Allergen Reactions   ? Rosuvastatin Other (See Comments)          Lab Results     Lab Results   Component Value Date    CHOL 160 01/05/2011    TRIG 158 (H) 01/05/2011    HDL 37 (L) 01/05/2011    LDLCALC 91 01/05/2011

## 2021-06-27 NOTE — PROGRESS NOTES
Progress Notes by Jhon Islas MD at 6/6/2019  4:40 PM     Author: Jhon Islas MD Service: -- Author Type: Physician    Filed: 6/10/2019  1:05 PM Encounter Date: 6/6/2019 Status: Signed    : Jhon Islas MD (Physician)                  [email or web address]  [phone]         Thank you Dr. Heart for asking the [facility] Heart Care team to participate in the care of your patient, Byron Nath.     Impression and Plan     1. Coronary artery disease. Byron has known coronary artery disease having had 2 vessel bypass surgery for March 2009 with CECE graft to the LAD and saphenous vein graft to the PL branch of the right coronary artery.    Patient underwent nuclear perfusion imaging 21 January 2019 which was favorable and revealed no evidence of infarct or ischemia (see Cardiac Diagnostic section below).  He has, however, he had some continued intermittent chest discomfort.  We discussed the various options including continued attempts at medical management.  Could consider direct angiography.  Ultimately was jointly decided with the patient to continue with medical therapy.  Plan:    Increase isosorbide mononitrate to 120 mg daily.    2. Hypertension. Blood pressures quite reasonable in the office today.    3. Dyslipidemia.  Lipid profile 6 October 2016 was fairly close to target with LDL 88 mg/dL and HDL 38 mg/dL.               History of Present Illness    Once again I would like to thank you again for asking me to participate in the care of your patient, Byron Nath.  As you know, but to reiterate for my own records, Byron Nath is a 88 y.o. male with known coronary disease. He underwent 2 vessel bypass surgery March 4, 2009 with CECE graft to the LAD and saphenous vein graft to the PL branch of the right coronary artery. Historically, he has had well-preserved LV systolic performance.     Interview today, Byron does report some increasing chest discomfort  "symptoms reminiscent of his angina.  He has taken nitroglycerin on occasion which has helped.  He also reports some mild increased shortness of breath with certain activities.  He denies any palpitations or lightheadedness.    Further review of systems is otherwise negative/noncontributory (medical record and 13 point review of systems reviewed as well and pertinent positives noted).         Cardiac Diagnostics          Echocardiogram 17 June 2016 (personally reviewed):  1. Normal left ventricular size and systolic performance. The ejection fraction is estimated to be 55%.  2. There is mild aortic valve sclerosis without significant valvular stenosis.    Echocardiogram from 7 May 2007  1. Normal LV size and systolic performance with ejection fraction of 65%.    2. No significant valvular disease.    Regadenoson nuclear perfusion study 21 January 2019:  1. No evidence of infarct or ischemia.  2. Normal left ventricular systolic performance with ejection fraction of 75%.    Regadenoson nuclear perfusion study 20 June 2017 (performed at [facility]):  1. No evidence of infarct or ischemia.  2. Normal left ventricular systolic performance with ejection fraction of 59%.    Nuclear stress perfusion study performed 12 March 2013:  1. No ECG evidence of ischemia.  2. No evidence of ischemia or infarct by nuclear imaging.  3. Normal ejection fraction at 72% without wall motion abnormality.             Physical Examination       /54 (Patient Site: Left Arm, Patient Position: Sitting, Cuff Size: Adult Large)   Pulse 60   Temp 98.3  F (36.8  C) (Oral)   Resp 16   Ht 5' 6\" (1.676 m)   Wt 194 lb 12.8 oz (88.4 kg)   BMI 31.44 kg/m          Wt Readings from Last 3 Encounters:   06/06/19 194 lb 12.8 oz (88.4 kg)   01/21/19 194 lb (88 kg)   10/11/18 195 lb (88.5 kg)     The patient is alert and oriented times three. Sclerae are anicteric. Mucosal membranes are moist. Jugular venous pressure is normal. No significant " adenopathy/thyromegally appreciated. Lungs are clear with good expansion. On cardiovascular exam, the patient has a regular S1 and S2. Abdomen is soft and non-tender. Extremities reveal no clubbing, cyanosis, or edema.         Family History/Social History/Risk Factors   Patient does not smoke.  Family history of hypertension.         Medications  Allergies   Current Outpatient Medications   Medication Sig Dispense Refill   ? acetylcysteine (NAC) 600 mg cap capsule Take 600 mg by mouth.     ? aspirin 81 MG EC tablet Take 81 mg by mouth daily.     ? calcium carbonate-vitamin D2 500 mg(1,250mg) -200 unit tablet Take 1 tablet by mouth 2 (two) times a day.     ? citalopram (CELEXA) 40 MG tablet Take 20 mg by mouth daily.      ? denosumab (PROLIA) 60 mg/mL Syrg Inject 60 mg under the skin every 6 (six) months.     ? isosorbide mononitrate (IMDUR) 30 MG 24 hr tablet TAKE 1 TABLET DAILY 90 tablet 1   ? loratadine (CLARITIN) 10 mg tablet Take 10 mg by mouth daily.     ? metoprolol (LOPRESSOR) 25 MG tablet Take 12.5 mg by mouth 2 (two) times a day.      ? MULTIVITAMIN ORAL Take by mouth.     ? nitroglycerin (NITROSTAT) 0.4 MG SL tablet Place 1 tablet (0.4 mg total) under the tongue every 5 (five) minutes as needed for chest pain. Max 6 tabs per day and call 911 if no relief 3 Bottle 2   ? polyethylene glycol (GLYCOLAX) 17 gram/dose powder Take 17 g by mouth daily.     ? simvastatin (ZOCOR) 40 MG tablet Take 40 mg by mouth bedtime.     ? docusate sodium (COLACE ORAL) Take by mouth.     ? isosorbide mononitrate (IMDUR) 120 MG 24 hr tablet Take 1 tablet (120 mg total) by mouth daily. 90 tablet 3     No current facility-administered medications for this visit.       Allergies   Allergen Reactions   ? Rosuvastatin Other (See Comments)          Lab Results     Lab Results   Component Value Date    CHOL 160 01/05/2011    TRIG 158 (H) 01/05/2011    HDL 37 (L) 01/05/2011    LDLCALC 91 01/05/2011

## 2021-06-28 NOTE — PROGRESS NOTES
Progress Notes by Jhon Islas MD at 9/12/2019 12:50 PM     Author: Jhon Islas MD Service: -- Author Type: Physician    Filed: 9/12/2019 12:51 PM Encounter Date: 9/12/2019 Status: Signed    : Jhon Islas MD (Physician)                  [email or web address]  [phone]         Thank you Dr. Heart for asking the [facility] Heart Care team to participate in the care of your patient, Byron Nath.     Impression and Plan     1. Coronary artery disease. Byron has known coronary artery disease having had 2 vessel bypass surgery for March 2009 with CECE graft to the LAD and saphenous vein graft to the PL branch of the right coronary artery.     Patient underwent nuclear perfusion imaging 21 January 2019 which was favorable and revealed no evidence of infarct or ischemia (see Cardiac Diagnostic section below).  Presently, he states he is pleased with how he is performing.  He has some occasional chest discomfort only and states that it is better than when he last saw me.  Plan to continue with medical therapy.  Again he is comfortable with this plan.     2. Hypertension. Blood pressures slightly elevated in the office today though this is something of an aberration.  Plan to continue current management.     3. Dyslipidemia.  Lipid profile 6 October 2016 was fairly close to target with LDL 88 mg/dL and HDL 38 mg/dL.    Plan to obtain direct LDL today.    Follow-up in 1 year.           History of Present Illness    Once again I would like to thank you again for asking me to participate in the care of your patient, Byron Nath.  As you know, but to reiterate for my own records, Byron Nath is a 88 y.o. male with known coronary disease. He underwent 2 vessel bypass surgery March 4, 2009 with CECE graft to the LAD and saphenous vein graft to the PL branch of the right coronary artery. Historically, he has had well-preserved LV systolic performance.     Interview today,  "Byron states that he has been doing fairly well from a cardiac standpoint.  He has some occasional chest discomfort symptoms though states that this is noticeably less than my last visit with him.  He reports no shortness of breath.  He denies any palpitations or lightheadedness.    Further review of systems is otherwise negative/noncontributory (medical record and 13 point review of systems reviewed as well and pertinent positives noted).         Cardiac Diagnostics      Echocardiogram 17 June 2016 (personally reviewed):  1. Normal left ventricular size and systolic performance. The ejection fraction is estimated to be 55%.  2. There is mild aortic valve sclerosis without significant valvular stenosis.    Echocardiogram from 7 May 2007  1. Normal LV size and systolic performance with ejection fraction of 65%.    2. No significant valvular disease.    Regadenoson nuclear perfusion study 21 January 2019:  1. No evidence of infarct or ischemia.  2. Normal left ventricular systolic performance with ejection fraction of 75%.    Regadenoson nuclear perfusion study 20 June 2017 (performed at [facility]):  1. No evidence of infarct or ischemia.  2. Normal left ventricular systolic performance with ejection fraction of 59%.    Nuclear stress perfusion study performed 12 March 2013:  1. No ECG evidence of ischemia.  2. No evidence of ischemia or infarct by nuclear imaging.  3. Normal ejection fraction at 72% without wall motion abnormality.           Physical Examination       /66 (Patient Site: Right Arm, Patient Position: Sitting, Cuff Size: Adult Regular)   Pulse 64   Resp 16   Ht 5' 6\" (1.676 m)   Wt 193 lb (87.5 kg)   BMI 31.15 kg/m          Wt Readings from Last 3 Encounters:   09/12/19 193 lb (87.5 kg)   06/06/19 194 lb 12.8 oz (88.4 kg)   01/21/19 194 lb (88 kg)       The patient is alert and oriented times three. Sclerae are anicteric. Mucosal membranes are moist. Jugular venous pressure is normal. " No significant adenopathy/thyromegally appreciated. Lungs are clear with good expansion. On cardiovascular exam, the patient has a regular S1 and S2. Abdomen is soft and non-tender. Extremities reveal no clubbing, cyanosis, mild lower extremity edema.       Family History/Social History/Risk Factors   Patient does not smoke.  Family history of hypertension.         Medications  Allergies   Current Outpatient Medications   Medication Sig Dispense Refill   ? acetylcysteine (NAC) 600 mg cap capsule Take 600 mg by mouth.     ? aspirin 81 MG EC tablet Take 81 mg by mouth daily.     ? calcium carbonate-vitamin D2 500 mg(1,250mg) -200 unit tablet Take 1 tablet by mouth 2 (two) times a day.     ? citalopram (CELEXA) 40 MG tablet Take 20 mg by mouth daily.      ? denosumab (PROLIA) 60 mg/mL Syrg Inject 60 mg under the skin every 6 (six) months.     ? isosorbide mononitrate (IMDUR) 120 MG 24 hr tablet Take 1 tablet (120 mg total) by mouth daily. 90 tablet 3   ? loratadine (CLARITIN) 10 mg tablet Take 10 mg by mouth daily.     ? metoprolol (LOPRESSOR) 25 MG tablet Take 12.5 mg by mouth 2 (two) times a day.      ? MULTIVITAMIN ORAL Take by mouth.     ? nitroglycerin (NITROSTAT) 0.4 MG SL tablet Place 1 tablet (0.4 mg total) under the tongue every 5 (five) minutes as needed for chest pain. Max 6 tabs per day and call 911 if no relief 3 Bottle 2   ? polyethylene glycol (GLYCOLAX) 17 gram/dose powder Take 17 g by mouth daily.     ? simvastatin (ZOCOR) 40 MG tablet Take 40 mg by mouth bedtime.     ? docusate sodium (COLACE ORAL) Take by mouth.     ? isosorbide mononitrate (IMDUR) 30 MG 24 hr tablet TAKE 1 TABLET DAILY 90 tablet 1     No current facility-administered medications for this visit.       Allergies   Allergen Reactions   ? Rosuvastatin Other (See Comments)          Lab Results     Lab Results   Component Value Date    CHOL 160 01/05/2011    TRIG 158 (H) 01/05/2011    HDL 37 (L) 01/05/2011    LDLCALC 91 01/05/2011

## 2021-06-28 NOTE — PROGRESS NOTES
"Progress Notes by Jhon Islas MD at 4/9/2020  7:50 AM     Author: Jhon Islas MD Service: -- Author Type: Physician    Filed: 4/9/2020  8:28 AM Encounter Date: 4/9/2020 Status: Signed    : Jhon Islas MD (Physician)          The patient has been notified of following:     \"This telephone visit will be conducted via a call between you and your physician/provider. We have found that certain health care needs can be provided without the need for a physical exam.  This service lets us provide the care you need with a phone conversation.  If a prescription is necessary we can send it directly to your pharmacy.  If lab work is needed we can place an order for that and you can then stop by our lab to have the test done at a later time. If during the course of the call the physician/provider feels a telephone visit is not appropriate, you will not be charged for this service.\" Verbal consent has been obtained for this service by care team member:         HEART CARE PHONE ENCOUNTER     The patient has chosen to have the visit conducted as a telephone visit, to reduce risk of exposure given the current status of Coronavirus in our community. This telephone visit is being conducted via a call between the patient and physician/provider. Health care needs are being provided without a physical exam.      Impression and Plan     1. Coronary artery disease. Byron has known coronary artery disease having had 2-vessel bypass surgery 4 March 2009 with CECE graft to the LAD and saphenous vein graft to the PL branch of the right coronary artery.     Plan did undergo a recent nuclear perfusion study 1 April 2020 at [facility] which returned favorable and revealed no evidence of infarct or ischemia.  Review of documentation from [place] where the test was performed suggested symptoms were felt somewhat atypical as well.    Presently he denies any ongoing chest pain and breathing is " comfortable.     2. Hypertension.  Patient, spouse, and son report that blood pressures have been very good on the current management.     3. Dyslipidemia.  Direct LDL 12 September 2019 was at/near target at 73 mg/dL.  Continue simvastatin.     Follow-up in 1 year.      Total time of call between patient and provider was 12 minutes     Start Time: 0813    Stop Time: 0825         History of Present Illness    Byron Nath is a 89 y.o. male who is being evaluated via a billable telephone visit.     Once again I would like to thank you again for asking me to participate in the care of your patient, Byron Nath.  As you know, but to reiterate for my own records, Byron Nath is a 89 y.o. male with known coronary disease. He underwent 2-vessel bypass surgery March 4, 2009 with CECE graft to the LAD and saphenous vein graft to the PL branch of the right coronary artery. Historically, he has had well-preserved LV systolic performance.     Interview today, Byron states that he has been doing fairly well from a cardiac standpoint.  He presently is denying any chest pain symptoms.  Breathing is comfortable.  He reports no palpitations or lightheadedness.  He denies any fevers, chills, or other constitutional symptoms.    Further review of systems is otherwise negative/noncontributory (medical record and 13 point review of systems reviewed as well and pertinent positives noted).         Cardiac Diagnostics     Echocardiogram 1 April 2020 (performed at [facility]):  1. Normal left ventricular size and systolic performance with ejection fraction of 55 to 60%.  2. No significant valvular heart disease.  3. Mild right ventricular enlargement with normal right ventricular systolic performance.  4. Mild biatrial enlargement.  5. Mild aortic root enlargement.     Echocardiogram 17 June 2016 (personally reviewed):  1. Normal left ventricular size and systolic performance. The ejection  fraction is estimated to be 55%.  2. There is mild aortic valve sclerosis without significant valvular stenosis.    Echocardiogram from 7 May 2007  1. Normal LV size and systolic performance with ejection fraction of 65%.    2. No significant valvular disease.    Nuclear perfusion imaging study 1 April 2020 (performed at [facility]):  1. No evidence of infarct or ischemia.    2. Ejection fraction 54%.    Regadenoson nuclear perfusion study 21 January 2019:  1. No evidence of infarct or ischemia.  2. Normal left ventricular systolic performance with ejection fraction of 75%.    Regadenoson nuclear perfusion study 20 June 2017 (performed at [facility]):  1. No evidence of infarct or ischemia.  2. Normal left ventricular systolic performance with ejection fraction of 59%.    Nuclear stress perfusion study performed 12 March 2013:  1. No ECG evidence of ischemia.  2. No evidence of ischemia or infarct by nuclear imaging.  3. Normal ejection fraction at 72% without wall motion abnormality.         Physical Examination           Wt Readings from Last 3 Encounters:   04/09/20 193 lb (87.5 kg)   09/12/19 193 lb (87.5 kg)   06/06/19 194 lb 12.8 oz (88.4 kg)     The patient has chosen to have the visit conducted as a telephone visit, to reduce risk of exposure given the current status of Coronavirus in our community. This telephone visit is being conducted via a call between the patient and physician/provider. Health care needs are being provided without a physical exam.          Family History/Social History/Risk Factors   Patient does not smoke.  Family history reviewed, and family history includes No Medical Problems in his brother, daughter, father, maternal aunt, maternal grandfather, maternal grandmother, maternal uncle, mother, paternal aunt, paternal grandfather, paternal grandmother, paternal uncle, sister, and son.        Medications  Allergies   Current Outpatient  Medications   Medication Sig Dispense Refill   ? acetylcysteine (NAC) 600 mg cap capsule Take 600 mg by mouth daily.      ? calcium carbonate-vitamin D2 500 mg(1,250mg) -200 unit tablet Take 1 tablet by mouth 2 (two) times a day.     ? citalopram (CELEXA) 40 MG tablet Take 20 mg by mouth daily.      ? clopidogreL (PLAVIX) 75 mg tablet Take 1 tablet by mouth daily.     ? denosumab (PROLIA) 60 mg/mL Syrg Inject 60 mg under the skin every 6 (six) months.     ? docusate sodium (COLACE ORAL) Take 1 capsule by mouth 2 (two) times a day.      ? donepeziL (ARICEPT) 5 MG tablet Take 1 tablet by mouth at bedtime.     ? isosorbide mononitrate (IMDUR) 120 MG 24 hr tablet Take 1 tablet (120 mg total) by mouth daily. 90 tablet 3   ? loratadine (CLARITIN) 10 mg tablet Take 10 mg by mouth daily.     ? metoprolol (LOPRESSOR) 25 MG tablet Take 12.5 mg by mouth 2 (two) times a day.      ? MULTIVITAMIN ORAL Take 1 tablet by mouth daily.      ? nitroglycerin (NITROSTAT) 0.4 MG SL tablet Place 1 tablet (0.4 mg total) under the tongue every 5 (five) minutes as needed for chest pain. Max 6 tabs per day and call 911 if no relief 3 Bottle 2   ? psyllium with dextrose (METAMUCIL JAR) powder Take by mouth daily.     ? simvastatin (ZOCOR) 40 MG tablet Take 40 mg by mouth bedtime.     ? tamsulosin (FLOMAX) 0.4 mg cap Take 1 capsule by mouth daily.     ? tolterodine (DETROL LA) 4 MG ER capsule Take 4 mg by mouth daily.     ? aspirin 81 MG EC tablet Take 81 mg by mouth daily.     ? isosorbide mononitrate (IMDUR) 30 MG 24 hr tablet TAKE 1 TABLET DAILY 90 tablet 1   ? polyethylene glycol (GLYCOLAX) 17 gram/dose powder Take 17 g by mouth daily.       No current facility-administered medications for this visit.       Allergies   Allergen Reactions   ? Rosuvastatin Other (See Comments)          Lab Results   No results found for: NA, K, CL, CO2, BUN, CREATININE, GLUCOSE, CALCIUM  No results found for: WBC, HGB, HCT, MCV, PLT  Lab Results   Component  Value Date    CHOL 160 01/05/2011    TRIG 158 (H) 01/05/2011    HDL 37 (L) 01/05/2011    LDLCALC 91 01/05/2011    LDLDIRECT 73 09/12/2019           Medical History  Surgical History   Past Medical History:   Diagnosis Date   ? Coronary artery disease     bypass   ? Hyperlipidemia    ? Hypertension    ? Low back pain       Past Surgical History:   Procedure Laterality Date   ? BYPASS GRAFT     ? scope knee

## 2021-06-29 NOTE — PROGRESS NOTES
Progress Notes by Francisco Alanis MD (Ted) at 10/2/2020 10:50 AM     Author: Francisco Alanis MD (Ted) Service: -- Author Type: Physician    Filed: 10/2/2020 11:26 AM Encounter Date: 10/2/2020 Status: Signed    : Francisco Alanis MD (Ted) (Physician)             Thank you, Dr. Banerjee, for asking [facility] to evaluate Mr. Byron Nath.      Assessment/Recommendations   Assessment:    Chest pain, recurrent, etiology unclear  Coronary artery disease with history of coronary artery bypass graft surgery in 2009  Hypertension  Hypercholesterolemia    Plan:  He has had recurrent visits to the emergency room because of chest pain.  He is not reassured by the results of the stress test from spring of this year.  I think it would be reasonable to perform coronary angiogram to reassess coronary circulation.  He understands the risk and benefits and wants to proceed.       History of Present Illness    Mr. Byron Nath is a 89 y.o. male who comes into rapid access clinic for follow-up from the emergency room.  3 days ago he developed left-sided nonexertional chest pain.  He took several nitroglycerin before pain started to go away.  There was no pleuritic features of the pain.  There was no associated shortness of breath.  ER evaluation did not reveal any acute changes.  He has a remote history of coronary artery bypass graft surgery in 2009.  He has had 4 recent emergency room visits because of chest pains.  In May 2020 he was admitted to [facility] and underwent nuclear stress test.  Results were unrevealing    ECG: Personally reviewed.  9/29/2020 sinus bradycardia first-degree AV block no acute ischemic changes  Echocardiogram 1 April 2020 (performed at [facility]):  1. Normal left ventricular size and systolic performance with ejection fraction of 55 to 60%.  2. No significant valvular heart disease.  3. Mild right  ventricular enlargement with normal right ventricular systolic performance.  4. Mild biatrial enlargement.  5. Mild aortic root enlargement.      Echocardiogram 17 June 2016 (personally reviewed):  1. Normal left ventricular size and systolic performance. The ejection fraction is estimated to be 55%.  2. There is mild aortic valve sclerosis without significant valvular stenosis.     Echocardiogram from 7 May 2007  1. Normal LV size and systolic performance with ejection fraction of 65%.    2. No significant valvular disease.     Nuclear perfusion imaging study 1 April 2020 (performed at [facility]):  1. No evidence of infarct or ischemia.    2. Ejection fraction 54%.     Regadenoson nuclear perfusion study 21 January 2019:  1. No evidence of infarct or ischemia.  2. Normal left ventricular systolic performance with ejection fraction of 75%.     Regadenoson nuclear perfusion study 20 June 2017 (performed at [facility]):  1. No evidence of infarct or ischemia.  2. Normal left ventricular systolic performance with ejection fraction of 59%.     Nuclear stress perfusion study performed 12 March 2013:  1. No ECG evidence of ischemia.  2. No evidence of ischemia or infarct by nuclear imaging.  3. Normal ejection fraction at 72% without wall motion abnormality.       Physical Examination Review of Systems   Vitals:    10/02/20 1100   BP: 128/50   Pulse: (!) 54   Resp: 10     Body mass index is 29.91 kg/m .  Wt Readings from Last 3 Encounters:   10/02/20 191 lb (86.6 kg)   09/29/20 199 lb (90.3 kg)   04/09/20 193 lb (87.5 kg)     General Appearance:   Alert, cooperative, no distress, appears stated age   Head/ENT: Normocephalic, without obvious abnormality. Membranes moist      EYES:  no scleral icterus, normal conjunctivae   Neck: Supple, symmetrical, trachea midline, no adenopathy, thyroid: not enlarged, symmetric, no carotid bruit or JVD   Chest/Lungs:   Lungs are clear to  auscultation, respirations unlabored. No tenderness or deformity    Cardiovascular:   Regular rhythm, S1, S2 normal, no murmur, rub or gallop.   Abdomen:  Soft, non-tender, bowel sounds active all four quadrants,  no masses, no organomegaly   Extremities: no cyanosis or clubbing. No edema   Skin: Skin color, texture, turgor normal, no rashes or lesions.    Psychiatric: Normal affect, calm   Neurologic: Alert and oriented x 3, moving all four extremities.     General: WNL  Eyes: WNL  Ears/Nose/Throat: WNL  Lungs: WNL  Heart: Chest Pain  Stomach: Diarrhea  Bladder: WNL  Muscle/Joints: WNL  Skin: WNL  Nervous System: WNL  Mental Health: WNL           Medical History  Surgical History Family History Social History   Past Medical History:   Diagnosis Date   ? Coronary artery disease     bypass   ? Hyperlipidemia    ? Hypertension    ? Low back pain     Past Surgical History:   Procedure Laterality Date   ? BYPASS GRAFT     ? scope knee      no family history of premature coronary artery disease Social History     Socioeconomic History   ? Marital status:      Spouse name: Not on file   ? Number of children: Not on file   ? Years of education: Not on file   ? Highest education level: Not on file   Occupational History   ? Not on file   Social Needs   ? Financial resource strain: Not on file   ? Food insecurity     Worry: Not on file     Inability: Not on file   ? Transportation needs     Medical: Not on file     Non-medical: Not on file   Tobacco Use   ? Smoking status: Former Smoker     Types: Cigarettes   ? Smokeless tobacco: Never Used   Substance and Sexual Activity   ? Alcohol use: No   ? Drug use: No   ? Sexual activity: Not on file   Lifestyle   ? Physical activity     Days per week: Not on file     Minutes per session: Not on file   ? Stress: Not on file   Relationships   ? Social connections     Talks on phone: Not on file     Gets together: Not on file     Attends Oriental orthodox service: Not on file     Active  member of club or organization: Not on file     Attends meetings of clubs or organizations: Not on file     Relationship status: Not on file   ? Intimate partner violence     Fear of current or ex partner: Not on file     Emotionally abused: Not on file     Physically abused: Not on file     Forced sexual activity: Not on file   Other Topics Concern   ? Not on file   Social History Narrative   ? Not on file          Medications  Allergies   Current Outpatient Medications   Medication Sig Dispense Refill   ? calcium carbonate-vitamin D3 600 mg calcium- 200 unit cap Take 1 tablet by mouth 2 (two) times a day.     ? citalopram (CELEXA) 40 MG tablet Take 20 mg by mouth daily.      ? clopidogreL (PLAVIX) 75 mg tablet Take 1 tablet by mouth every evening.      ? denosumab 60 mg/mL Syrg Inject 60 mg under the skin every 6 (six) months.     ? docusate sodium (COLACE ORAL) Take 1 capsule by mouth 2 (two) times a day.      ? donepeziL (ARICEPT) 10 MG tablet Take 10 mg by mouth at bedtime.     ? isosorbide mononitrate (IMDUR) 120 MG 24 hr tablet Take 1 tablet  by mouth daily. 90 tablet 3   ? loratadine (CLARITIN) 10 mg tablet Take 10 mg by mouth daily.     ? melatonin 3 mg Tab tablet Take 6 mg by mouth at bedtime.     ? metoprolol (LOPRESSOR) 25 MG tablet Take 12.5 mg by mouth 2 (two) times a day.      ? nitroglycerin (NITROSTAT) 0.4 MG SL tablet Place 1 tablet (0.4 mg total) under the tongue every 5 (five) minutes as needed for chest pain. Max 6 tabs per day and call 911 if no relief 3 Bottle 2   ? psyllium with dextrose (METAMUCIL JAR) powder Take 3.4 g by mouth daily.      ? simvastatin (ZOCOR) 40 MG tablet Take 40 mg by mouth bedtime.     ? tamsulosin (FLOMAX) 0.4 mg cap Take 0.4 mg by mouth every evening.      ? tolterodine (DETROL LA) 4 MG ER capsule Take 4 mg by mouth daily.       No current facility-administered medications for this visit.       Allergies   Allergen Reactions   ? Rosuvastatin Other (See Comments)      Myalgia          Lab Results    Chemistry/lipid CBC Cardiac Enzymes/BNP/TSH/INR   Lab Results   Component Value Date    CHOL 160 01/05/2011    HDL 37 (L) 01/05/2011    LDLCALC 91 01/05/2011    TRIG 158 (H) 01/05/2011    CREATININE 1.15 09/29/2020    BUN 16 09/29/2020    K 4.7 09/29/2020     09/29/2020     09/29/2020    CO2 26 09/29/2020    Lab Results   Component Value Date    WBC 5.6 09/29/2020    HGB 12.5 (L) 09/29/2020    HCT 37.2 (L) 09/29/2020    MCV 96 09/29/2020     (L) 09/29/2020    Lab Results   Component Value Date    TROPONINI <0.01 09/29/2020

## 2021-06-29 NOTE — PROGRESS NOTES
Progress Notes by Alissa Mccall CNP at 10/20/2020  2:10 PM     Author: Alissa Mccall CNP Service: -- Author Type: Nurse Practitioner    Filed: 10/20/2020  2:39 PM Encounter Date: 10/20/2020 Status: Signed    : Alissa Mccall CNP (Nurse Practitioner)             Assessment/Recommendations   1.  Coronary artery disease: He was seen in the emergency room on September 29, 2020 with chest pain.  Coronary angiogram on October 6, 2020 showed patent grafts and moderate lesions of RCA and LAD that were not significant.  Plan to treat medically.  He reports small hematoma of the right femoral puncture site that is decreasing in size.  He declines assessment.    2.  Dyslipidemia: He continues to take simvastatin 40 mg daily.     3.  Hypertension: His blood pressure is well controlled today at 122/52.    He plans to start exercising again at his assisted living.  He previously used the treadmill and did group exercise classes.    Byron Nath will follow up with Dr. Islas on November 17.  He will call if he has any worsening chest pain.        History of Present Illness/Subjective    Byron Nath is seen at [facility] for follow up.  He has a history of coronary artery bypass surgery in 2009, hypertension, and dyslipidemia.  He was seen in the emergency room on September 29, 2020 with chest pain.  Coronary angiogram on October 6, 2020 showed patent grafts and moderate lesions of RCA and LAD that were not significant.  Plan to treat medically.     He has occasional, brief episodes of chest pain every 2 to 3 days.  He states that this has improved over the past few weeks.  He denies any associated symptoms.  Pain typically occurs in the morning and can happen at rest or with activity.  He explains that he massages the area of pain and it resolves which sounds more like muscular pain.  He occasionally takes nitro with relief.  He denies lightheadedness, shortness of  breath, dyspnea on exertion and lower extremity edema.      Results for orders placed during the hospital encounter of 10/06/20   Cardiac Catheterization [CATH01] 10/06/2020    Narrative This is a 89 years old gentleman with past medical history of CAD s/p CABG   in 2009 (LIMA to LAD, SVG to right PL at [place]) who is here for coronary   angiography/bypass graft angiography.  The patient has been seen in the ER   several times this year for atypical chest pain. Stress test and echo were   normal in April. Coronary angiogram was ordered given his recurrent   symptoms and hospital presentations.    Right femoral access 0.4 Icelandic sheath.  JL4 and JR4 for selective   coronary angiography.  MP2 was used for rSVG graft angiography.  CECE   catheter was used for LIMA graft.    Left main: Minimal disease  LAD:  of mid LAD.  Gives rise to 2 diagonal branches.  Before branching   diagonals there is moderate lesion in the proximal LAD. LAD occluded after   D2.  LCx: Minimal disease, small system.  RCA: Dominant large system.  There is moderate stenosis just before   bifurcation into RPDA and RPL.  Right PL has competitive flow from SVG   graft.    Bypass graft angiography  LIMA to LAD.  Nonselective angiography.  Patent LIMA graft.  SVG to right PL.  Patent graft.    In brief, 2 bypass grafts are patent.  There are 2 moderate lesion in the   RCA and proximal LAD. After the discussion with the patient, the decision   was made to check FFR of distal RCA lesion and proximal LAD lesion. Both   of the lesions were physiologically nonsignificant.  The patient will be   managed medically given his atypical anginal symptoms and physiologically   nonsignificant lesions.    Conclusions  -Multivessel CAD s/p CABG  -Patent LIMA to LAD graft and patent SVG to right PL graft  -Successful physiological assessment of distal RCA and proximal LAD   lesions  -Physiologically nonsignificant distal RCA lesion and proximal LAD lesions  -Normal  LVEDP of 14 mmHg.  No pressure gradient of AO/LV  -Successful deployment of Angio-Seal device for arteriotomy closure    Recommendations  -Medical management for CAD  -Usual postprocedure care and a same-day discharge      FFR of distal RCA  Sheath was upsized to 6 Slovenian.  Heparin was given for procedure.  JR4 6   Slovenian guide was engaged into RCA.  Flow wire was placed into the right   PDA.  IV adenosine was used for FFR measurement.  FFR was 0.93 which is   physiologically nonsignificant.  Final angiography showed no   dissection/embolization/extravasation.    FFR of proximal LAD   Slovenian guiding catheter was engaged into left main.  A flow wire   was placed into diagonal 2 branch.  FFR with IV adenosine was 0.93 which   is also physiologically nonsignificant.  Final angiography showed no   dissection/embolization/extravasation.    Left heart catheterization  4 Slovenian pigtail was inserted into the left ventricle.  LVEDP was 14 mmHg.   There is no pressure gradient between LV and aorta.    Femoral angiography  The right femoral artery is without stenosis.  6 Slovenian Angio-Seal device   was applied for arteriotomy closure without complications.        Physical Examination Review of Systems   Vitals:    10/20/20 1406   BP: 122/52   Pulse: 60   Resp: 16     Body mass index is 30.82 kg/m .  Wt Readings from Last 3 Encounters:   10/20/20 196 lb 12.8 oz (89.3 kg)   10/06/20 189 lb 9.6 oz (86 kg)   10/02/20 191 lb (86.6 kg)       General Appearance:     Alert, cooperative and in no acute distress.   ENT/Mouth: membranes moist, no oral lesions or bleeding gums.      EYES:  no scleral icterus, normal conjunctivae   Chest/Lungs:   lungs are clear to auscultation, no rales or wheezing, respirations unlabored   Cardiovascular:   Regular. Normal first and second heart sounds, trace edema bilateral lower extremities    Abdomen:  Soft, nontender, nondistended, bowel sounds present   Extremities: no cyanosis or clubbing    Skin:  Neurologic: warm, dry.  mood and affect are appropriate, alert and oriented x3     Puncture Site:  He reports small hematoma right femoral puncture site but declines assessment.  Radial pulses and Pedal pulses intact and symmetrical.  CMS intact.        General: WNL  Eyes: WNL  Ears/Nose/Throat: WNL  Lungs: WNL  Heart: WNL  Stomach: WNL  Bladder: WNL  Muscle/Joints: WNL  Skin: WNL  Nervous System: WNL  Mental Health: WNL     Blood: WNL     Medical History  Surgical History Family History Social History   Past Medical History:   Diagnosis Date   ? Coronary artery disease     bypass   ? Hyperlipidemia    ? Hypertension    ? Low back pain     Past Surgical History:   Procedure Laterality Date   ? BYPASS GRAFT  2009   ? CV CORONARY ANGIOGRAM N/A 10/6/2020    Procedure: Coronary Angiogram;  Surgeon: Nereida Rivers MD;  Location: [facility] Cath Lab;  Service: Cardiology   ? CV LEFT HEART CATHETERIZATION WO LEFT VETRICULOGRAM Left 10/6/2020    Procedure: Left Heart Catheterization Without Left Ventriculogram;  Surgeon: Nereida Rivers MD;  Location: [facility] Cath Lab;  Service: Cardiology   ? scope knee      Family History   Problem Relation Age of Onset   ? No Medical Problems Mother    ? No Medical Problems Father    ? No Medical Problems Sister    ? No Medical Problems Brother    ? No Medical Problems Daughter    ? No Medical Problems Son    ? No Medical Problems Maternal Aunt    ? No Medical Problems Maternal Uncle    ? No Medical Problems Paternal Aunt    ? No Medical Problems Paternal Uncle    ? No Medical Problems Maternal Grandmother    ? No Medical Problems Maternal Grandfather    ? No Medical Problems Paternal Grandmother    ? No Medical Problems Paternal Grandfather     Social History     Socioeconomic History   ? Marital status:      Spouse name: Not on file   ? Number of children: Not on file   ? Years of education: Not on file   ? Highest education level: Not on file   Occupational  History   ? Not on file   Social Needs   ? Financial resource strain: Not on file   ? Food insecurity     Worry: Not on file     Inability: Not on file   ? Transportation needs     Medical: Not on file     Non-medical: Not on file   Tobacco Use   ? Smoking status: Former Smoker     Types: Cigarettes   ? Smokeless tobacco: Never Used   Substance and Sexual Activity   ? Alcohol use: No   ? Drug use: No   ? Sexual activity: Not on file   Lifestyle   ? Physical activity     Days per week: Not on file     Minutes per session: Not on file   ? Stress: Not on file   Relationships   ? Social connections     Talks on phone: Not on file     Gets together: Not on file     Attends Evangelical service: Not on file     Active member of club or organization: Not on file     Attends meetings of clubs or organizations: Not on file     Relationship status: Not on file   ? Intimate partner violence     Fear of current or ex partner: Not on file     Emotionally abused: Not on file     Physically abused: Not on file     Forced sexual activity: Not on file   Other Topics Concern   ? Not on file   Social History Narrative   ? Not on file          Medications  Allergies   Current Outpatient Medications   Medication Sig Dispense Refill   ? calcium carbonate-vitamin D3 600 mg calcium- 200 unit cap Take 1 tablet by mouth 2 (two) times a day.     ? citalopram (CELEXA) 40 MG tablet Take 20 mg by mouth daily.      ? clopidogreL (PLAVIX) 75 mg tablet Take 1 tablet by mouth every evening.      ? denosumab 60 mg/mL Syrg Inject 60 mg under the skin every 6 (six) months.     ? docusate sodium (COLACE ORAL) Take 1 capsule by mouth 2 (two) times a day.      ? donepeziL (ARICEPT) 10 MG tablet Take 10 mg by mouth at bedtime.     ? isosorbide mononitrate (IMDUR) 120 MG 24 hr tablet Take 1 tablet  by mouth daily. 90 tablet 3   ? loratadine (CLARITIN) 10 mg tablet Take 10 mg by mouth daily.     ? melatonin 3 mg Tab tablet Take 6 mg by mouth at bedtime.     ?  metoprolol (LOPRESSOR) 25 MG tablet Take 12.5 mg by mouth 2 (two) times a day.      ? nitroglycerin (NITROSTAT) 0.4 MG SL tablet Place 1 tablet (0.4 mg total) under the tongue every 5 (five) minutes as needed for chest pain. Max 6 tabs per day and call 911 if no relief 3 Bottle 2   ? pantoprazole (PROTONIX) 40 MG tablet Take 40 mg by mouth daily.     ? psyllium with dextrose (METAMUCIL JAR) powder Take 3.4 g by mouth daily.      ? simvastatin (ZOCOR) 40 MG tablet Take 40 mg by mouth bedtime.     ? tamsulosin (FLOMAX) 0.4 mg cap Take 0.4 mg by mouth every evening.      ? tolterodine (DETROL LA) 4 MG ER capsule Take 4 mg by mouth daily.       No current facility-administered medications for this visit.     Allergies   Allergen Reactions   ? Rosuvastatin Other (See Comments)     Myalgia          Lab Results    Chemistry/lipid CBC Cardiac Enzymes/BNP/TSH/INR   Lab Results   Component Value Date    CHOL 125 10/06/2020    HDL 34 (L) 10/06/2020    LDLCALC 64 10/06/2020    TRIG 137 10/06/2020    CREATININE 1.15 10/06/2020    BUN 17 10/06/2020    K 4.6 10/06/2020     10/06/2020     10/06/2020    CO2 30 10/06/2020    Lab Results   Component Value Date    WBC 5.3 10/06/2020    HGB 12.3 (L) 10/06/2020    HCT 36.3 (L) 10/06/2020    MCV 96 10/06/2020     (L) 10/06/2020    Lab Results   Component Value Date    TROPONINI <0.01 09/29/2020

## 2021-06-30 NOTE — PROGRESS NOTES
Progress Notes by Jhon Islas MD at 11/17/2020 12:50 PM     Author: Jhon Islas MD Service: -- Author Type: Physician    Filed: 11/17/2020  1:16 PM Encounter Date: 11/17/2020 Status: Signed    : Jhon Islas MD (Physician)                                       Thank you Dr. Heart for asking the [facility] team to participate in the care of your patient, Byron Nath.     Impression and Plan     1. Coronary artery disease. Byron has known coronary artery disease having had 2-vessel bypass surgery 4 March 2009 with CECE graft to the LAD and saphenous vein graft to the PL branch of the right coronary artery.     Patient underwent coronary angiography 6 October 2020.  This revealed 2 moderate lesion involving the RCA and proximal LAD. After the discussion with the patient, the decision was made to check FFR of distal RCA lesion and proximal LAD lesion. Both of the lesions were physiologically nonsignificant.  The patient will be managed medically given his atypical anginal symptoms and physiologically nonsignificant lesions.    Patient at this time is not reporting any symptoms concerning for angina.     2. Hypertension.  Blood pressure has been fairly reasonable per patient.  Slightly elevated in the office today.  We will simply continue with current management.     3. Dyslipidemia.  Lipid profile 6 October 2020 revealed LDL 64 mg/dL and HDL 34 mg/dL.    Continue simvastatin.     Follow-up in 1 year.             History of Present Illness    Once again I would like to thank you again for asking me to participate in the care of your patient, Byron Nath.  As you know, but to reiterate for my own records, Byron Nath is a 89 y.o. male with known coronary disease. He underwent 2-vessel bypass surgery March 4, 2009 with CECE graft to the LAD and saphenous vein graft to the PL branch of the right coronary artery. Historically, he has had well-preserved LV  systolic performance.    Patient underwent coronary angiography 6 October 2020.  This revealed 2 moderate lesion involving the RCA and proximal LAD. After the discussion with the patient, the decision was made to check FFR of distal RCA lesion and proximal LAD lesion. Both of the lesions were physiologically nonsignificant.  The patient will be managed medically given his atypical anginal symptoms and physiologically nonsignificant lesions.     Interview today, Byron states that he has been doing fairly well from a cardiac standpoint.  He does report some occasional chest discomfort though not predictable with exertion and quite sporadic.  He has been doing more physical therapy and exercising and this has not brought in any symptoms of chest pain.  His breathing has been fairly comfortable and at/near baseline.  Denies palpitations or lightheadedness.  No fevers, chills, or other constitutional symptoms.    Further review of systems is otherwise negative/noncontributory (medical record and 13 point review of systems reviewed as well and pertinent positives noted).         Cardiac Diagnostics      Echocardiogram 1 April 2020 (performed at [facility]):  1. Normal left ventricular size and systolic performance with ejection fraction of 55 to 60%.  2. No significant valvular heart disease.  3. Mild right ventricular enlargement with normal right ventricular systolic performance.  4. Mild biatrial enlargement.  5. Mild aortic root enlargement.     Echocardiogram 17 June 2016 (personally reviewed):  1. Normal left ventricular size and systolic performance. The ejection fraction is estimated to be 55%.  2. There is mild aortic valve sclerosis without significant valvular stenosis.    Echocardiogram from 7 May 2007  1. Normal LV size and systolic performance with ejection fraction of 65%.    2. No significant valvular disease.    Nuclear perfusion imaging study 1 April 2020 (performed  "at [facility]):  1. No evidence of infarct or ischemia.    2. Ejection fraction 54%.    Regadenoson nuclear perfusion study 21 January 2019:  1. No evidence of infarct or ischemia.  2. Normal left ventricular systolic performance with ejection fraction of 75%.    Regadenoson nuclear perfusion study 20 June 2017 (performed at [facility]):  No evidence of infarct or ischemia.  Normal left ventricular systolic performance with ejection fraction of 59%.    Nuclear stress perfusion study performed 12 March 2013:  1. No ECG evidence of ischemia.  2. No evidence of ischemia or infarct by nuclear imaging.  3. Normal ejection fraction at 72% without wall motion abnormality.     Coronary angiogram 6 October 2020:  1. Left main coronary artery: Minimal disease.  2. Left anterior descending coronary artery: Chronic total occlusion of mid LAD.  There is complete occlusion after the second diagonal.  3. Circumflex coronary artery: Minimal disease.  Small system.  4. Right coronary artery: Dominant vessel. There is moderate stenosis just before bifurcation into RPDA and RPL.  Right PL has competitive flow from SVG graft.  5. CECE graft to LAD was patent.  6. Saphenous vein graft to right PL was patent.  o In brief, 2 bypass grafts are patent.  There are 2 moderate lesion involving the RCA and proximal LAD. After the discussion with the patient, the decision was made to check FFR of distal RCA lesion and proximal LAD lesion. Both of the lesions were physiologically nonsignificant.  The patient will be managed medically given his atypical anginal symptoms and physiologically nonsignificant lesions.           Physical Examination       /60 (Patient Site: Right Arm, Patient Position: Sitting, Cuff Size: Adult Regular)   Pulse (!) 56   Resp 16   Ht 5' 7\" (1.702 m)   Wt 193 lb (87.5 kg) Comment: With shoes.  SpO2 93%   BMI 30.23 kg/m          Wt Readings from Last 3 Encounters: "   11/17/20 193 lb (87.5 kg)   10/20/20 196 lb 12.8 oz (89.3 kg)   10/06/20 189 lb 9.6 oz (86 kg)       The patient is alert and oriented times three. Sclerae are anicteric. Mucosal membranes are moist. Jugular venous pressure is normal. No significant adenopathy/thyromegally appreciated. Lungs are clear with good expansion. On cardiovascular exam, the patient has a regular S1 and S2. Abdomen is soft and non-tender. Extremities reveal no clubbing, cyanosis, with mild bilateral lower extremity edema.         Family History/Social History/Risk Factors   Patient does not smoke.  Family history reviewed, and family history includes No Medical Problems in his brother, daughter, father, maternal aunt, maternal grandfather, maternal grandmother, maternal uncle, mother, paternal aunt, paternal grandfather, paternal grandmother, paternal uncle, sister, and son.          Medications  Allergies   Current Outpatient Medications   Medication Sig Dispense Refill   ? calcium carbonate-vitamin D3 600 mg calcium- 200 unit cap Take 1 tablet by mouth 2 (two) times a day.     ? citalopram (CELEXA) 40 MG tablet Take 40 mg by mouth daily.      ? denosumab 60 mg/mL Syrg Inject 60 mg under the skin every 6 (six) months.     ? docusate sodium (COLACE ORAL) Take 1 capsule by mouth 2 (two) times a day.      ? donepeziL (ARICEPT) 10 MG tablet Take 10 mg by mouth at bedtime.     ? isosorbide mononitrate (IMDUR) 120 MG 24 hr tablet Take 1 tablet  by mouth daily. 90 tablet 3   ? loratadine (CLARITIN) 10 mg tablet Take 10 mg by mouth daily.     ? metoprolol (LOPRESSOR) 25 MG tablet Take 12.5 mg by mouth 2 (two) times a day.      ? nitroglycerin (NITROSTAT) 0.4 MG SL tablet Place 1 tablet (0.4 mg total) under the tongue every 5 (five) minutes as needed for chest pain. Max 6 tabs per day and call 911 if no relief 3 Bottle 2   ? psyllium with dextrose (METAMUCIL JAR) powder Take 3.4 g by mouth daily as needed.      ? simvastatin (ZOCOR) 40 MG tablet  Take 40 mg by mouth bedtime.     ? tamsulosin (FLOMAX) 0.4 mg cap Take 0.4 mg by mouth every evening.      ? clopidogreL (PLAVIX) 75 mg tablet Take 1 tablet by mouth every evening.      ? melatonin 3 mg Tab tablet Take 6 mg by mouth at bedtime.     ? pantoprazole (PROTONIX) 40 MG tablet Take 40 mg by mouth daily.     ? tolterodine (DETROL LA) 4 MG ER capsule Take 4 mg by mouth daily.       No current facility-administered medications for this visit.       Allergies   Allergen Reactions   ? Rosuvastatin Other (See Comments)     Myalgia           Lab Results   Lab Results   Component Value Date     10/06/2020    K 4.6 10/06/2020     10/06/2020    CO2 30 10/06/2020    BUN 17 10/06/2020    CREATININE 1.15 10/06/2020    CALCIUM 8.8 10/06/2020     Lab Results   Component Value Date    WBC 5.3 10/06/2020    HGB 12.3 (L) 10/06/2020    HCT 36.3 (L) 10/06/2020    MCV 96 10/06/2020     (L) 10/06/2020     Lab Results   Component Value Date    CHOL 125 10/06/2020    TRIG 137 10/06/2020    HDL 34 (L) 10/06/2020    LDLCALC 64 10/06/2020    LDLDIRECT 73 09/12/2019     Lab Results   Component Value Date    TROPONINI <0.01 09/29/2020    TROPONINI <0.01 09/29/2020

## 2021-09-11 ENCOUNTER — HEALTH MAINTENANCE LETTER (OUTPATIENT)
Age: 86
End: 2021-09-11

## 2022-06-18 ENCOUNTER — HEALTH MAINTENANCE LETTER (OUTPATIENT)
Age: 87
End: 2022-06-18

## 2022-10-30 ENCOUNTER — HEALTH MAINTENANCE LETTER (OUTPATIENT)
Age: 87
End: 2022-10-30

## 2023-06-25 ENCOUNTER — HEALTH MAINTENANCE LETTER (OUTPATIENT)
Age: 88
End: 2023-06-25